# Patient Record
Sex: MALE | Race: WHITE | NOT HISPANIC OR LATINO
[De-identification: names, ages, dates, MRNs, and addresses within clinical notes are randomized per-mention and may not be internally consistent; named-entity substitution may affect disease eponyms.]

---

## 2021-01-17 ENCOUNTER — TRANSCRIPTION ENCOUNTER (OUTPATIENT)
Age: 31
End: 2021-01-17

## 2021-04-13 ENCOUNTER — TRANSCRIPTION ENCOUNTER (OUTPATIENT)
Age: 31
End: 2021-04-13

## 2021-11-08 ENCOUNTER — TRANSCRIPTION ENCOUNTER (OUTPATIENT)
Age: 31
End: 2021-11-08

## 2022-10-14 ENCOUNTER — NON-APPOINTMENT (OUTPATIENT)
Age: 32
End: 2022-10-14

## 2022-10-14 ENCOUNTER — APPOINTMENT (OUTPATIENT)
Dept: ORTHOPEDIC SURGERY | Facility: CLINIC | Age: 32
End: 2022-10-14

## 2022-10-14 VITALS
SYSTOLIC BLOOD PRESSURE: 115 MMHG | HEART RATE: 54 BPM | HEIGHT: 69 IN | BODY MASS INDEX: 25.18 KG/M2 | WEIGHT: 170 LBS | DIASTOLIC BLOOD PRESSURE: 71 MMHG

## 2022-10-14 DIAGNOSIS — M54.12 RADICULOPATHY, CERVICAL REGION: ICD-10-CM

## 2022-10-14 PROCEDURE — 72050 X-RAY EXAM NECK SPINE 4/5VWS: CPT

## 2022-10-14 PROCEDURE — 99204 OFFICE O/P NEW MOD 45 MIN: CPT

## 2022-10-14 NOTE — DISCUSSION/SUMMARY
[de-identified] : 32-year-old male with left-sided cervical radiculopathy for couple months\par \par Extensive discussion held with the patient about the natural history of this issue.  We discussed a trial of conservative treatment involving stretches and exercise and anti-inflammatories as needed.  Due to the neurologic symptoms been going on for 2 months I ordered an MRI of his cervical spine.  We discussed that most of these issues resolve with conservative treatment.  Then possibly steroid injections or ultimately spine surgery.  Encouraged the patient to remain active but avoid high impact activities or anything that will cause his head to get hit.  Patient was given handouts for home exercise and stretches.  A prescription for Medrol Dosepak was given to try to relieve the inflammation.  We will call the patient with the results of his MRI.

## 2022-10-14 NOTE — HISTORY OF PRESENT ILLNESS
[de-identified] : 32-year-old male presents with left-sided neck pain that radiates down his arm.  States he does have numbness and tingling worst in the first 3 fingers.  States this began a couple months ago he tried an inversion table which seemed to help but then it came back.  Patient is active in martial arts and wrestling believes wrestling they have initially started this.  He then got his martial arts a couple times and has had SnapBack which made the situation worse.  He feels his left arm is very weak compared to the right.  Denies issues with fine motor tasks.

## 2022-10-14 NOTE — PHYSICAL EXAM
[de-identified] : General Appearance: normal without acute distress\par Mental: Alert and oriented x 3\par Psych/affect: appropriate, cooperative\par \par Cervical spine\par Palpation: Mildly tender to palpation over left paraspinal muscles\par Motor: 5/5 C5-T1\par Sensory: 2/2 C5-T1\par Metzger: Negative [de-identified] : 4 views of the cervical spine taken today in office and reviewed–no abnormalities or instability appreciated

## 2022-11-04 ENCOUNTER — APPOINTMENT (OUTPATIENT)
Dept: MRI IMAGING | Facility: CLINIC | Age: 32
End: 2022-11-04

## 2023-01-06 ENCOUNTER — NON-APPOINTMENT (OUTPATIENT)
Age: 33
End: 2023-01-06

## 2023-04-17 ENCOUNTER — APPOINTMENT (OUTPATIENT)
Dept: ORTHOPEDIC SURGERY | Facility: CLINIC | Age: 33
End: 2023-04-17
Payer: COMMERCIAL

## 2023-04-17 ENCOUNTER — NON-APPOINTMENT (OUTPATIENT)
Age: 33
End: 2023-04-17

## 2023-04-17 PROCEDURE — 99213 OFFICE O/P EST LOW 20 MIN: CPT

## 2023-04-17 NOTE — DISCUSSION/SUMMARY
[de-identified] : Discussed findings of today's exam and possible causes of patient's pain.  Educated patient on their most probable diagnosis of acute right ankle pain after jumping exercise with sudden onset of right Achilles pain, clinical concern for at least high-grade tear if not complete rupture at the mid substance of the Achilles tendon.  Reviewed possible courses of treatment, and we collaboratively decided best course of treatment at this time will include advanced imaging with MRI to evaluate for extent of Achilles tendon tear/rupture.  Patient will be immobilized in a cam walker boot with an appropriate heel lift, he should be using this for ambulatory assistive device.  Patient will follow up when MRI results are available.  This appears to be an acute complicated injury of uncertain prognosis.  Patient is advised that I am a nonsurgical physician, we can review his MRI results over the phone as soon as they are available as if he does have a tendon rupture I would help facilitate consultation with one of my foot and ankle orthopedic associates.  Patient appreciates and agrees with current plan.\par \par This note was generated using dragon medical dictation software.  A reasonable effort has been made for proofreading its contents, but typos may still remain.  If there are any questions or points of clarification needed please notify my office.

## 2023-04-17 NOTE — PHYSICAL EXAM
[de-identified] : Constitutional: Well-nourished, well-developed, No acute distress\par Respiratory:  Good respiratory effort, no SOB\par Lymphatic: No regional lymphadenopathy, no lymphedema\par Psychiatric: Pleasant and normal affect, alert and oriented x3\par Musculoskeletal: normal except where as noted in regional exam\par \par Right ankle:\par APPEARANCE: + swelling and ecchymosis in the posterior ankle, no marked deformities or malalignment\par POSITIVE TENDERNESS: + With palpation of the posterior ankle overlying the Achilles tendon, palpable soft tissue defect within the Achilles tendon\par NONTENDER: medial malleolus, lateral malleolus, tibialis posterior tendon, ATFL, CFL, PTFL, anterior tibiofibular ligament (high ankle), sinus tarsus, deltoid ligaments, 5th metatarsal. \par RANGE OF MOTION: Limited dorsiflexion due to stiffness, swelling and pain. \par RESISTIVE TESTING: + Pain and weakness with resisted plantar flexion, mildly painful inversion/eversion/dorsiflexion \par SPECIAL TESTS: + Oro test \par

## 2023-04-17 NOTE — PROCEDURE
[de-identified] : Procedure:  Diagnostic Musculoskeletal Ultrasound of the right posterior ankle/Achilles tendon\par Indication: Clinical concern for Achilles tendon rupture\par \par Utlizing the [employer] II Edge portable ultrasound machine, and the Linear 6cm 13-6 MHz transducer, images of the involved area were obtained and saved.  Patient had linear homogenous signal within the distal Achilles tendon at its insertion on the calcaneus, however traveling proximally about 1-3 cm proximal to its insertion there was noted mixed heterogeneous signal, there was noted fluid due to the hypoechoic signal around the tendon, and there is a very mixed heterogeneous signal both longitudinally and transverse exam of the mid substance of the Achilles tendon concerning for at least partial high-grade tear if not full tendon rupture.\par \par \par Impression: Likely high-grade if not complete rupture of the mid substance of the Achilles tendon

## 2023-04-17 NOTE — HISTORY OF PRESENT ILLNESS
[de-identified] : Patient is here for right calf pain. Patient reports he on last Saturday around 1 week ago he jumped and felt that someone kicked on the back of the right ankle. Patient reports he has difficulty walking. He reports he has range of motion however dorsiflexion is painful. He has tried ice and elevation. \par \par The patient's past medical history, past surgical history, medications and allergies were reviewed by me today and documented accordingly. In addition, the patient's family and social history, which were noncontributory to this visit, were reviewed also. Intake form was reviewed. The patient has no family history of arthritis.

## 2023-04-18 ENCOUNTER — APPOINTMENT (OUTPATIENT)
Dept: ORTHOPEDIC SURGERY | Facility: CLINIC | Age: 33
End: 2023-04-18

## 2023-04-21 ENCOUNTER — RESULT REVIEW (OUTPATIENT)
Age: 33
End: 2023-04-21

## 2023-04-21 ENCOUNTER — OUTPATIENT (OUTPATIENT)
Dept: OUTPATIENT SERVICES | Facility: HOSPITAL | Age: 33
LOS: 1 days | End: 2023-04-21
Payer: COMMERCIAL

## 2023-04-21 DIAGNOSIS — Z00.00 ENCOUNTER FOR GENERAL ADULT MEDICAL EXAMINATION WITHOUT ABNORMAL FINDINGS: ICD-10-CM

## 2023-04-21 PROCEDURE — 73600 X-RAY EXAM OF ANKLE: CPT | Mod: 26,RT

## 2023-04-21 PROCEDURE — 73590 X-RAY EXAM OF LOWER LEG: CPT | Mod: 26,RT

## 2023-04-21 PROCEDURE — 73620 X-RAY EXAM OF FOOT: CPT | Mod: 26,RT

## 2023-04-25 ENCOUNTER — NON-APPOINTMENT (OUTPATIENT)
Age: 33
End: 2023-04-25

## 2023-04-25 ENCOUNTER — OUTPATIENT (OUTPATIENT)
Dept: OUTPATIENT SERVICES | Facility: HOSPITAL | Age: 33
LOS: 1 days | End: 2023-04-25

## 2023-04-25 ENCOUNTER — APPOINTMENT (OUTPATIENT)
Dept: MRI IMAGING | Facility: CLINIC | Age: 33
End: 2023-04-25
Payer: COMMERCIAL

## 2023-04-25 DIAGNOSIS — S86.019A STRAIN OF UNSPECIFIED ACHILLES TENDON, INITIAL ENCOUNTER: ICD-10-CM

## 2023-04-25 PROCEDURE — 73721 MRI JNT OF LWR EXTRE W/O DYE: CPT | Mod: 26,RT

## 2023-04-28 ENCOUNTER — APPOINTMENT (OUTPATIENT)
Dept: ORTHOPEDIC SURGERY | Facility: CLINIC | Age: 33
End: 2023-04-28
Payer: COMMERCIAL

## 2023-04-28 DIAGNOSIS — M79.661 PAIN IN RIGHT LOWER LEG: ICD-10-CM

## 2023-04-28 DIAGNOSIS — S86.019A STRAIN OF UNSPECIFIED ACHILLES TENDON, INITIAL ENCOUNTER: ICD-10-CM

## 2023-04-28 PROCEDURE — 99214 OFFICE O/P EST MOD 30 MIN: CPT

## 2023-04-28 NOTE — PHYSICAL EXAM
[de-identified] : Right ankle Physical Examination:\par \par General: Alert and oriented x3.  In no acute distress.  Pleasant in nature with a normal affect.  No apparent respiratory distress. \par Erythema, Warmth, Rubor: Negative\par Swelling: Positive\par \par ROM:\par Limited due to the Achilles tendon rupture and pain.\par \par Tenderness to Palpation: \par 1. Lateral Malleolus: Negative\par 2. Medial Malleolus: Negative\par 3. Proximal Fibular Pain: Negative\par 4. Heel Pain: Negative\par 5. Cuboid: Negative\par 6. Navicular: Negative\par 7. Tibiotalar Joint: Negative\par 8. Subtalar Joint: Negative\par 9. Posterior Recess: Negative\par \par Tendon Pain:\par 1. Achilles: Positive with a positive Oro test.\par 2. Peroneals: Negative\par 3. Posterior Tibialis: Negative\par 4. Tibialis Anterior: Negative\par \par Ligament Pain:\par 1. ATFL: Negative\par 2. CFL: Negative \par 3. PTFL: Negative\par 4. Deltoid Ligaments: Negative\par 5. Lis Franc Ligament: Negative\par \par Stability: \par 1. Anterior Drawer: Negative\par 2. Posterior Drawer: Negative\par \par Strength: 5/5 TA/GS/EHL\par \par Pulses: 2+ DP/PT Pulses\par \par Neuro: Intact motor and sensory\par \par Additional Test:\par 1. Calcaneal Squeeze Test: Negative\par 2. Syndesmosis Squeeze Test: Negative [de-identified] : MR Ankle No Cont, Right             Final\par \par No Documents Attached\par \par \par \par \par   EXAM: [id number] - MR ANKLE RT  - ORDERED BY: GEORGE STEVENSON\par \par \par PROCEDURE DATE:  04/25/2023\par \par \par \par INTERPRETATION:  EXAMINATION: MRI of the right ankle without contrast\par \par CLINICAL INFORMATION: Right ankle pain\par \par TECHNIQUE: Multiplanar, multisequential MR imaging was performed.\par \par FINDINGS: There is high-grade tearing of the proximal substance of the Achilles tendon at the level of its myotendinous junction with a thickened, wavy, and lax appearance of the tendon at this level level and marked adjacent myofascial edema and diffuse subcutaneous soft tissue edema. There is no well-defined/fluid-filled tear gap.\par \par There is no fracture or osteonecrosis. There are no joint effusions. Articular cartilage at the imaged joints is preserved. There are no subchondral abnormalities. The medial and lateral ankle ligaments are intact.\par \par The other tendons at the ankle are intact. There is no muscular atrophy. The plantar fascia is intact.\par \par IMPRESSION: High-grade tearing of the proximal substance of the Achilles tendon at the level of its myotendinous junction\par \par --- End of Report ---\par \par \par \par \par \par \par KODY MOULTON MD; Attending Radiologist\par This document has been electronically signed. Apr 25 2023  3:09PM\par \par  \par \par  Ordered by: RODO STEVENSON       Collected/Examined: 25Apr2023 03:01PM       \par Verified by: RODO STEVENSON 25Apr2023 03:21PM       \par  Result Communication: No patient communication needed at this time;\par Stage: Final       \par  Performed at: [facility]       Resulted: 25Apr2023 03:01PM       Last Updated: 25Apr2023 03:21PM       Accession: [id number]       \par \par \par \par \par EXAM: [id number] - XR FOOT 2 VIEWS RT - ORDERED BY: OZZY GATES\par \par EXAM: [id number] - XR ANKLE 2 VIEWS RT - ORDERED BY: OZZY GATES\par \par EXAM: [id number] - XR TIB FIB AP LAT 2 VIEWS RT - ORDERED BY: OZZY GATES\par \par \par PROCEDURE DATE: 04/21/2023\par \par \par \par INTERPRETATION: CLINICAL INDICATION: Calf, foot and ankle pain\par \par EXAM: AP and lateral views of the right tibia/fibula AP, lateral and oblique views of the right ankle and AP, lateral and oblique views of the right foot\par \par COMPARISON: None\par \par \par IMPRESSION:\par No acute fracture dislocation. Joint spaces are maintained. Os peroneum is present. Benign-appearing lucency is present within the medial talar head.\par \par --- End of Report ---\par \par \par \par \par \par \par JOSÉ CABALLERO MD; Attending Radiologist\par This document has been electronically signed. Apr 21 2023 1:58PM\par \par

## 2023-04-28 NOTE — ADDENDUM
[FreeTextEntry1] : I, GISELA COTA, acted solely as a scribe for Dr. Marlo Sahu on this date 04/28/2023  .\par  \par All medical record entries made by the Scribe were at my, Dr. Marlo Sahu, direction and personally dictated by me on 04/28/2023 . I have reviewed the chart and agree that the record accurately reflects my personal performance of the history, physical exam, assessment and plan. I have also personally directed, reviewed, and agreed with the chart.

## 2023-04-28 NOTE — HISTORY OF PRESENT ILLNESS
[FreeTextEntry1] : JO MARIANO is a 32 year old male who presents for initial evaluation of his right Achilles tendon.  The patient ruptured his Achilles tendon on 4/8/23. He was evaluated by Dr. Garza who ordered an MRI of the right Achilles. He states that he currently has no pain but has swelling to the lower leg around his ankle. He reports that he has been compliant with wearing a CAM boot and has been bearing weight on the right ankle. He presents today for review of his MRI results today in office. He presents today wearing slippers and is ambulating without assistance. He is currently taking no pain medication. No other complaints at this time.

## 2023-04-30 ENCOUNTER — APPOINTMENT (OUTPATIENT)
Dept: ULTRASOUND IMAGING | Facility: CLINIC | Age: 33
End: 2023-04-30
Payer: COMMERCIAL

## 2023-04-30 ENCOUNTER — EMERGENCY (EMERGENCY)
Facility: HOSPITAL | Age: 33
LOS: 0 days | Discharge: HOME | End: 2023-04-30
Attending: STUDENT IN AN ORGANIZED HEALTH CARE EDUCATION/TRAINING PROGRAM
Payer: COMMERCIAL

## 2023-04-30 ENCOUNTER — OUTPATIENT (OUTPATIENT)
Dept: OUTPATIENT SERVICES | Facility: HOSPITAL | Age: 33
LOS: 1 days | End: 2023-04-30
Payer: COMMERCIAL

## 2023-04-30 VITALS — HEIGHT: 70 IN | WEIGHT: 179.9 LBS

## 2023-04-30 VITALS
DIASTOLIC BLOOD PRESSURE: 80 MMHG | HEART RATE: 69 BPM | RESPIRATION RATE: 16 BRPM | TEMPERATURE: 98 F | SYSTOLIC BLOOD PRESSURE: 115 MMHG | OXYGEN SATURATION: 96 %

## 2023-04-30 DIAGNOSIS — I82.401 ACUTE EMBOLISM AND THROMBOSIS OF UNSPECIFIED DEEP VEINS OF RIGHT LOWER EXTREMITY: ICD-10-CM

## 2023-04-30 DIAGNOSIS — Z79.01 LONG TERM (CURRENT) USE OF ANTICOAGULANTS: ICD-10-CM

## 2023-04-30 DIAGNOSIS — M79.661 PAIN IN RIGHT LOWER LEG: ICD-10-CM

## 2023-04-30 LAB
ALBUMIN SERPL ELPH-MCNC: 3.9 G/DL — SIGNIFICANT CHANGE UP (ref 3.3–5)
ALP SERPL-CCNC: 90 U/L — SIGNIFICANT CHANGE UP (ref 40–120)
ALT FLD-CCNC: 30 U/L — SIGNIFICANT CHANGE UP (ref 12–78)
ANION GAP SERPL CALC-SCNC: 3 MMOL/L — LOW (ref 5–17)
APTT BLD: 31.9 SEC — SIGNIFICANT CHANGE UP (ref 27.5–35.5)
AST SERPL-CCNC: 30 U/L — SIGNIFICANT CHANGE UP (ref 15–37)
BASOPHILS # BLD AUTO: 0.07 K/UL — SIGNIFICANT CHANGE UP (ref 0–0.2)
BASOPHILS NFR BLD AUTO: 0.9 % — SIGNIFICANT CHANGE UP (ref 0–2)
BILIRUB SERPL-MCNC: 0.3 MG/DL — SIGNIFICANT CHANGE UP (ref 0.2–1.2)
BUN SERPL-MCNC: 26 MG/DL — HIGH (ref 7–23)
CALCIUM SERPL-MCNC: 9.1 MG/DL — SIGNIFICANT CHANGE UP (ref 8.5–10.1)
CHLORIDE SERPL-SCNC: 107 MMOL/L — SIGNIFICANT CHANGE UP (ref 96–108)
CO2 SERPL-SCNC: 26 MMOL/L — SIGNIFICANT CHANGE UP (ref 22–31)
CREAT SERPL-MCNC: 1.02 MG/DL — SIGNIFICANT CHANGE UP (ref 0.5–1.3)
EGFR: 101 ML/MIN/1.73M2 — SIGNIFICANT CHANGE UP
EOSINOPHIL # BLD AUTO: 0.19 K/UL — SIGNIFICANT CHANGE UP (ref 0–0.5)
EOSINOPHIL NFR BLD AUTO: 2.5 % — SIGNIFICANT CHANGE UP (ref 0–6)
GLUCOSE SERPL-MCNC: 98 MG/DL — SIGNIFICANT CHANGE UP (ref 70–99)
HCT VFR BLD CALC: 40.4 % — SIGNIFICANT CHANGE UP (ref 39–50)
HGB BLD-MCNC: 13.6 G/DL — SIGNIFICANT CHANGE UP (ref 13–17)
IMM GRANULOCYTES NFR BLD AUTO: 0.4 % — SIGNIFICANT CHANGE UP (ref 0–0.9)
INR BLD: 1.2 RATIO — HIGH (ref 0.88–1.16)
LYMPHOCYTES # BLD AUTO: 2.14 K/UL — SIGNIFICANT CHANGE UP (ref 1–3.3)
LYMPHOCYTES # BLD AUTO: 27.6 % — SIGNIFICANT CHANGE UP (ref 13–44)
MCHC RBC-ENTMCNC: 29.1 PG — SIGNIFICANT CHANGE UP (ref 27–34)
MCHC RBC-ENTMCNC: 33.7 GM/DL — SIGNIFICANT CHANGE UP (ref 32–36)
MCV RBC AUTO: 86.5 FL — SIGNIFICANT CHANGE UP (ref 80–100)
MONOCYTES # BLD AUTO: 0.44 K/UL — SIGNIFICANT CHANGE UP (ref 0–0.9)
MONOCYTES NFR BLD AUTO: 5.7 % — SIGNIFICANT CHANGE UP (ref 2–14)
NEUTROPHILS # BLD AUTO: 4.88 K/UL — SIGNIFICANT CHANGE UP (ref 1.8–7.4)
NEUTROPHILS NFR BLD AUTO: 62.9 % — SIGNIFICANT CHANGE UP (ref 43–77)
PLATELET # BLD AUTO: 351 K/UL — SIGNIFICANT CHANGE UP (ref 150–400)
POTASSIUM SERPL-MCNC: 3.9 MMOL/L — SIGNIFICANT CHANGE UP (ref 3.5–5.3)
POTASSIUM SERPL-SCNC: 3.9 MMOL/L — SIGNIFICANT CHANGE UP (ref 3.5–5.3)
PROT SERPL-MCNC: 7.6 GM/DL — SIGNIFICANT CHANGE UP (ref 6–8.3)
PROTHROM AB SERPL-ACNC: 13.9 SEC — HIGH (ref 10.5–13.4)
RBC # BLD: 4.67 M/UL — SIGNIFICANT CHANGE UP (ref 4.2–5.8)
RBC # FLD: 11.6 % — SIGNIFICANT CHANGE UP (ref 10.3–14.5)
SODIUM SERPL-SCNC: 136 MMOL/L — SIGNIFICANT CHANGE UP (ref 135–145)
WBC # BLD: 7.75 K/UL — SIGNIFICANT CHANGE UP (ref 3.8–10.5)
WBC # FLD AUTO: 7.75 K/UL — SIGNIFICANT CHANGE UP (ref 3.8–10.5)

## 2023-04-30 PROCEDURE — 80053 COMPREHEN METABOLIC PANEL: CPT

## 2023-04-30 PROCEDURE — 93971 EXTREMITY STUDY: CPT | Mod: 26,RT

## 2023-04-30 PROCEDURE — 36415 COLL VENOUS BLD VENIPUNCTURE: CPT

## 2023-04-30 PROCEDURE — 85025 COMPLETE CBC W/AUTO DIFF WBC: CPT

## 2023-04-30 PROCEDURE — 85730 THROMBOPLASTIN TIME PARTIAL: CPT

## 2023-04-30 PROCEDURE — 99285 EMERGENCY DEPT VISIT HI MDM: CPT

## 2023-04-30 PROCEDURE — 93971 EXTREMITY STUDY: CPT

## 2023-04-30 PROCEDURE — 85610 PROTHROMBIN TIME: CPT

## 2023-04-30 PROCEDURE — 99284 EMERGENCY DEPT VISIT MOD MDM: CPT

## 2023-04-30 RX ORDER — APIXABAN 2.5 MG/1
10 TABLET, FILM COATED ORAL ONCE
Refills: 0 | Status: COMPLETED | OUTPATIENT
Start: 2023-04-30 | End: 2023-04-30

## 2023-04-30 RX ORDER — APIXABAN 2.5 MG/1
1 TABLET, FILM COATED ORAL
Qty: 42 | Refills: 0
Start: 2023-04-30 | End: 2023-05-20

## 2023-04-30 RX ADMIN — APIXABAN 10 MILLIGRAM(S): 2.5 TABLET, FILM COATED ORAL at 18:42

## 2023-04-30 NOTE — ED STATDOCS - OBJECTIVE STATEMENT
Pt is a 33 y/o M w/ no PMHx who recently had a right achilles tendon tear and was planned for surgery on Tuesday 5/2. pt was sent in because he had an ultrasound for the right lower extremity and found to have occlusive thrombus in the posterior tibial, peroneal and soleal veins of the calf and was sent in for evaluation and management. Pt denies new LE edema, recent surgeries, h/o VTE, long distance travel, and denies tobacco use.

## 2023-04-30 NOTE — ED ADULT NURSE NOTE - OBJECTIVE STATEMENT
pt presents to ED with complaints of blood clot found on out pt US. pt sent in as he was supposed to have right achilles surgery on 5/2

## 2023-04-30 NOTE — ED STATDOCS - PHYSICAL EXAMINATION
PHYSICAL EXAM:  GENERAL: in NAD, Sitting comfortable in bed, in no respiratory distress  HEAD: Atraumatic, no handy's sign, no periorbital ecchymosis   EYES: PERRL, EOMs intact b/l w/out deficits  ENMT: Moist membranes, no anterior/posterior, or supraclavicular LAD  CHEST/LUNG: CTAB no wheezes/rhonchi/rales  HEART: RRR no murmur/gallops/rubs  ABDOMEN: +BS, soft, NT, ND  EXTREMITIES: No LE edema, +2 radial pulses b/l, RLE with +2 ankle and foot edema  NERVOUS SYSTEM:  A&Ox4, No motor deficits or sensory deficits to b/l UEs  Heme/LYMPH: No ecchymosis or bruising or LAD  SKIN:  No new rashes or DTIs

## 2023-04-30 NOTE — ED STATDOCS - NS ED ROS FT
Constitutional: Denies fevers & chills  HEENT: Denies Rhinorrhea & sore throat  Cardiac: Denies Chest pain & new LE edema  Pulmonary: Denies Shortness of breath & Cough  Abdomen: Denies Abdominal pain, N/V, blood in stools, diarrhea   : Denies dysuria & hematuria.  Skin: Denies Rash or itching  Neuro: Denies new visual changes, confusion, headaches, and one sided paralysis  Psych: Denies SI & HI & Depression

## 2023-04-30 NOTE — ED STATDOCS - NSFOLLOWUPINSTRUCTIONS_ED_ALL_ED_FT
You were seen at [facility] for a lower extremity DVT.     You had normal kidney function and were given Eliquis for anticoagulation.     You are to continue to follow-up with your orthopedic surgeon as instructed and he will call you to coordinate your surgery.     You are to take Eliquis 10mg twice a day for 7 days and then 5mg twice a day after this for at least 3-6 months.     You are to follow-up with your primary care doctor as soon as possible and a hematologist (referral numbers given below) as soon as possible for further management of your anti-coagulation.     You should return to the hospital if you begin to have any bright red blood in your stools, black tarry stools, nausea with vomiting of blood or black coffee ground like material, if your have any head trauma, or for any other concerns.    Cherry Sheehan MD  Phone: [phone]  Address: [facility] [place] [address]

## 2023-04-30 NOTE — ED STATDOCS - PROGRESS NOTE DETAILS
Spoke to ortho and Attending  Will discharge home on Eliquis and will f/u orthopaedics for plan for surgery in the near future. Will also give referral for management of anticoagulation to Heme and also instructions to see his PMD.   -MARY ELLEN Cruz-MS, MD  Internal/Emergency/Critical Medicine

## 2023-04-30 NOTE — ED STATDOCS - PATIENT PORTAL LINK FT
You can access the FollowMyHealth Patient Portal offered by [facility] by registering at the following website: [email or web address]. By joining [employer]’s FollowMyHealth portal, you will also be able to view your health information using other applications (apps) compatible with our system.

## 2023-04-30 NOTE — ED STATDOCS - CLINICAL SUMMARY MEDICAL DECISION MAKING FREE TEXT BOX
pt is a 38 y/o Male with recent right achilles tear pending surgery now with provoked DVT without symptoms of PE at this time.   Will get CBC, CMP, Coags  Will discuss with Ortho about AC mgt as pt ok to be d/yola home on PO DOAC at this time with follow-up  Given this is a provoked DVT with definitive cause pt needs 3-6 months of AC  Will likely discharge home

## 2023-04-30 NOTE — CONSULT NOTE ADULT - SUBJECTIVE AND OBJECTIVE BOX
32y Male was sent in by Dr. Sahu for LE doppler positive for RLE DVT. Patient states he tore his Achilles tendon 3 weeks ago while exercising. Denies HS, LOC, or other injuries. He was able to ambulate with a limp afterwards and saw Dr. Garza 2 weeks after the injury who referred him to Dr. Sahu. He had an appointment with Dr. Sahu on 4/28 and an MRI and Dopplers were obtained. The MRI showed a high grade tear at the myotendinous junction and surgery was scheduled for Tuesday, 5/2. Patient reports taking ASA 325mg daily as prescribed by Dr. Sahu. Denies numbness/tingling in the affected extremity. Denies CP, SOB, palpitations.    PAST MEDICAL & SURGICAL HISTORY:  No pertinent past medical history      No significant past surgical history        Home Medications:    Allergies    No Known Allergies    Intolerances                        Vital Signs Last 24 Hrs  T(C): 36.9 (30 Apr 2023 16:50), Max: 36.9 (30 Apr 2023 16:50)  T(F): 98.5 (30 Apr 2023 16:50), Max: 98.5 (30 Apr 2023 16:50)  HR: 69 (30 Apr 2023 16:50) (69 - 69)  BP: 115/80 (30 Apr 2023 16:50) (115/80 - 115/80)  BP(mean): --  RR: 16 (30 Apr 2023 16:50) (16 - 16)  SpO2: 96% (30 Apr 2023 16:50) (96% - 96%)    Parameters below as of 30 Apr 2023 16:50  Patient On (Oxygen Delivery Method): room air        PHYSICAL EXAM  General: NAD, Awake and Alert  RLE  skin intact  no TTP along Achilles tendon  negative Oro test  TA/GSC/EHL/FHL intact  calves NTTP  compartments soft and compressible  DP/PT palpable        Assessment/Plan:  32y Male with R Achilles tear complicated by RLE DVT    -DVT/PE workup and ppx/treatment per ED  -no ortho contraindication to beginning DVT treatment  -WBAT RLE in [facility] boot  -ortho stable for DC  -FU with Dr. Sahu in office this week, call office for appointment  -discussed with Dr. Sahu who agrees with plan

## 2023-04-30 NOTE — CONSULT NOTE ADULT - CONSULT REASON
RLE DVT on ipsilateral leg with Achilles tear RLE DVT on ipsilateral leg with Achilles tear  Consult initially seen as patient checking in to ED , ~1623, notified prior to patient's arrival ~1513

## 2023-05-01 ENCOUNTER — APPOINTMENT (OUTPATIENT)
Dept: ORTHOPEDIC SURGERY | Facility: CLINIC | Age: 33
End: 2023-05-01
Payer: COMMERCIAL

## 2023-05-01 ENCOUNTER — OUTPATIENT (OUTPATIENT)
Dept: OUTPATIENT SERVICES | Facility: HOSPITAL | Age: 33
LOS: 1 days | Discharge: HOME | End: 2023-05-01

## 2023-05-01 DIAGNOSIS — I82.409 ACUTE EMBOLISM AND THROMBOSIS OF UNSPECIFIED DEEP VEINS OF UNSPECIFIED LOWER EXTREMITY: ICD-10-CM

## 2023-05-01 PROCEDURE — 99442: CPT

## 2023-05-02 ENCOUNTER — APPOINTMENT (OUTPATIENT)
Dept: ORTHOPEDIC SURGERY | Facility: HOSPITAL | Age: 33
End: 2023-05-02

## 2023-05-03 ENCOUNTER — NON-APPOINTMENT (OUTPATIENT)
Age: 33
End: 2023-05-03

## 2023-05-03 ENCOUNTER — APPOINTMENT (OUTPATIENT)
Dept: HEMATOLOGY ONCOLOGY | Facility: CLINIC | Age: 33
End: 2023-05-03
Payer: COMMERCIAL

## 2023-05-03 VITALS
BODY MASS INDEX: 26.66 KG/M2 | OXYGEN SATURATION: 97 % | HEIGHT: 69 IN | HEART RATE: 59 BPM | TEMPERATURE: 97.3 F | SYSTOLIC BLOOD PRESSURE: 132 MMHG | WEIGHT: 180 LBS | DIASTOLIC BLOOD PRESSURE: 83 MMHG

## 2023-05-03 DIAGNOSIS — Z78.9 OTHER SPECIFIED HEALTH STATUS: ICD-10-CM

## 2023-05-03 PROBLEM — S86.019A STRAIN OF UNSPECIFIED ACHILLES TENDON, INITIAL ENCOUNTER: Chronic | Status: ACTIVE | Noted: 2023-05-01

## 2023-05-03 PROBLEM — M54.12 RADICULOPATHY, CERVICAL REGION: Chronic | Status: ACTIVE | Noted: 2023-05-01

## 2023-05-03 PROCEDURE — 99204 OFFICE O/P NEW MOD 45 MIN: CPT

## 2023-05-03 RX ORDER — METHYLPREDNISOLONE 4 MG/1
4 TABLET ORAL
Qty: 1 | Refills: 0 | Status: DISCONTINUED | COMMUNITY
Start: 2022-10-14 | End: 2023-05-03

## 2023-05-03 RX ORDER — ASPIRIN 325 MG/1
325 TABLET ORAL DAILY
Qty: 30 | Refills: 2 | Status: DISCONTINUED | COMMUNITY
Start: 2023-04-28 | End: 2023-05-03

## 2023-05-04 NOTE — PHYSICAL EXAM
[Normal] : affect appropriate [de-identified] : Ambulates with boot, heel lifts, and crutches  [de-identified] : no edema [de-identified] : CAM boot right leg

## 2023-05-04 NOTE — CONSULT LETTER
[Dear  ___] : Dear  [unfilled], [Consult Letter:] : I had the pleasure of evaluating your patient, [unfilled]. [Please see my note below.] : Please see my note below. [Consult Closing:] : Thank you very much for allowing me to participate in the care of this patient.  If you have any questions, please do not hesitate to contact me. [Sincerely,] : Sincerely, [FreeTextEntry3] : Milka Rojas MD\par Medical Oncology/Hematology\par [facility] [place], [facility]\par \par \par [facility]\par

## 2023-05-04 NOTE — ASSESSMENT
[FreeTextEntry1] : 32 year old male with high grade tear of R achilles tendon, sustained injury on 4/8/23.\par US Duplex Venous LE on 4/30/23 - showed DVT involving the posterior tibial, peroneal and soleal veins of the right calf. He is on eliquis.\par \par He has no prior personal or family history of VTE.\par \par This is a provoked DVT in setting of trauma/limited mobility.  Little utility for thrombophilia testing in this setting. We discussed minimum 3 months of AC.\par Discussed with patient to follow up with Dr. Sahu, re:surgery\par \par

## 2023-05-04 NOTE — ADDENDUM
[FreeTextEntry1] : Documented by Callie Nieves acting as scribe for Dr. Rojas on 05/03/2023.\par \par All Medical record entries made by the Scribe were at my, Dr. Rojas, direction and personally dictated by me on 05/03/2023. I have reviewed the chart and agree that the record accurately reflects my personal performance of the history, physical exam, assessment and plan. I have also personally directed, reviewed, and agreed with the discharge instructions.

## 2023-05-04 NOTE — HISTORY OF PRESENT ILLNESS
[de-identified] : OJ MARIANO is a 32 year old M with no personal or family h/o blood clots or bleeding disorder who was diagnosed with Right leg below the knee DVT at age 32 in 4/2023. Patient was referred by ortho, Dr. Sahu. \par \par On 4/8/23, pt was exercising in martial arts class, jumped and suffered a high-grade tearing of the proximal substance of the Achilles tendon at the level of its myotendinous junction of his right ankle. He's currently in a CAM boot. \par \par Patient obtained US Duplex Venous LE on 4/30/23 demonstrating Acute below the knee DVT involving the posterior tibial, peroneal and soleal veins of the right calf.\par \par Patient presents for initial visit. Ambulates with CAM boot, heel lifts and crutches. \par Reports RLE swelling, worse with boot\par Denies R leg pain \par Denies testosterone or anabolic steroid use. \par On Eliquis 10 mg BID started on 4/30/23\par \par \par FMH: Denies family h/o blood clots \par Sx: hand sx, tonsillitis, cyst in throat removed, shoulder sx \par Socx: Never smoker, social EtOH use. 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 Sahu\par Vascular surgeon Dr. Grimes\par \par

## 2023-05-08 ENCOUNTER — APPOINTMENT (OUTPATIENT)
Dept: ORTHOPEDIC SURGERY | Facility: CLINIC | Age: 33
End: 2023-05-08
Payer: COMMERCIAL

## 2023-05-08 DIAGNOSIS — S86.011A STRAIN OF RIGHT ACHILLES TENDON, INITIAL ENCOUNTER: ICD-10-CM

## 2023-05-08 PROCEDURE — 99442: CPT

## 2023-05-10 ENCOUNTER — APPOINTMENT (OUTPATIENT)
Dept: ORTHOPEDIC SURGERY | Facility: CLINIC | Age: 33
End: 2023-05-10

## 2023-05-17 ENCOUNTER — APPOINTMENT (OUTPATIENT)
Dept: ORTHOPEDIC SURGERY | Facility: CLINIC | Age: 33
End: 2023-05-17

## 2023-05-17 ENCOUNTER — NON-APPOINTMENT (OUTPATIENT)
Age: 33
End: 2023-05-17

## 2023-05-19 ENCOUNTER — APPOINTMENT (OUTPATIENT)
Dept: ULTRASOUND IMAGING | Facility: CLINIC | Age: 33
End: 2023-05-19

## 2023-05-22 ENCOUNTER — APPOINTMENT (OUTPATIENT)
Dept: ULTRASOUND IMAGING | Facility: CLINIC | Age: 33
End: 2023-05-22
Payer: COMMERCIAL

## 2023-05-22 PROCEDURE — 93971 EXTREMITY STUDY: CPT | Mod: RT

## 2023-06-02 ENCOUNTER — TRANSCRIPTION ENCOUNTER (OUTPATIENT)
Age: 33
End: 2023-06-02

## 2023-07-19 RX ORDER — APIXABAN 5 MG/1
5 TABLET, FILM COATED ORAL
Qty: 180 | Refills: 2 | Status: ACTIVE | COMMUNITY
Start: 2023-04-30 | End: 1900-01-01

## 2023-07-31 ENCOUNTER — OUTPATIENT (OUTPATIENT)
Dept: OUTPATIENT SERVICES | Facility: HOSPITAL | Age: 33
LOS: 1 days | Discharge: HOME | End: 2023-07-31

## 2023-07-31 DIAGNOSIS — I82.409 ACUTE EMBOLISM AND THROMBOSIS OF UNSPECIFIED DEEP VEINS OF UNSPECIFIED LOWER EXTREMITY: ICD-10-CM

## 2023-08-01 ENCOUNTER — APPOINTMENT (OUTPATIENT)
Dept: HEMATOLOGY ONCOLOGY | Facility: CLINIC | Age: 33
End: 2023-08-01
Payer: COMMERCIAL

## 2023-08-01 VITALS
DIASTOLIC BLOOD PRESSURE: 70 MMHG | WEIGHT: 175.82 LBS | OXYGEN SATURATION: 97 % | HEIGHT: 69 IN | TEMPERATURE: 97.9 F | SYSTOLIC BLOOD PRESSURE: 110 MMHG | BODY MASS INDEX: 26.04 KG/M2 | HEART RATE: 69 BPM

## 2023-08-01 DIAGNOSIS — I82.431 ACUTE EMBOLISM AND THROMBOSIS OF RIGHT POPLITEAL VEIN: ICD-10-CM

## 2023-08-01 PROCEDURE — 99214 OFFICE O/P EST MOD 30 MIN: CPT

## 2023-08-01 NOTE — ASSESSMENT
[FreeTextEntry1] : 32 year old male with high grade tear of R achilles tendon, sustained injury on 4/8/23. Developed provoked DVT.  US Duplex Venous LE on 4/30/23 - showed DVT involving the posterior tibial, peroneal and soleal veins of the right calf. He is on eliquis.  He has no prior personal or family history of VTE.  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  S/p right achilles tendon repair by Dr. Luis F Mitchell on 5/23/23.  Plan: This is a provoked DVT in setting of trauma/limited mobility.   Continue eliquis for another 1 month until he is out of cast and fully able to bear weight on R leg.   Follow up PRN S/S of VTE reviewed.

## 2023-08-01 NOTE — HISTORY OF PRESENT ILLNESS
[de-identified] : OJ MARIANO is a 32 year old M with no personal or family h/o blood clots or bleeding disorder who was diagnosed with Right leg below the knee DVT at age 32 in 4/2023. Patient was referred by ortho, Dr. Sahu. \par  \par  On 4/8/23, pt was exercising in martial arts class, jumped and suffered a high-grade tearing of the proximal substance of the Achilles tendon at the level of its myotendinous junction of his right ankle. He's currently in a CAM boot. \par  \par  Patient obtained US Duplex Venous LE on 4/30/23 demonstrating Acute below the knee DVT involving the posterior tibial, peroneal and soleal veins of the right calf.\par  \par  Patient presents for initial visit. Ambulates with CAM boot, heel lifts and crutches. \par  Reports RLE swelling, worse with boot\par  Denies R leg pain \par  Denies testosterone or anabolic steroid use. \par  On Eliquis 10 mg BID started on 4/30/23\par  \par  \par  FMH: Denies family h/o blood clots \par  Sx: hand sx, tonsillitis, cyst in throat removed, shoulder sx \par  Socx: Never smoker, social EtOH use. 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 Sahu\par  Vascular surgeon Dr. Grimes\par  \par   [de-identified] : Returns for follow up. S/p right achilles tendon repair by Dr. Luis F Mitchell on 5/23/23. He is still in a cast but mobility has improved. Getting PT 1-2 times per week.  he continues on eliquis 5 mg BID.  No leg pain, swelling.

## 2023-08-01 NOTE — CONSULT LETTER
[Dear  ___] : Dear  [unfilled], [Consult Letter:] : I had the pleasure of evaluating your patient, [unfilled]. [Please see my note below.] : Please see my note below. [Consult Closing:] : Thank you very much for allowing me to participate in the care of this patient.  If you have any questions, please do not hesitate to contact me. [Sincerely,] : Sincerely, [FreeTextEntry3] : Milka Rojas MD\par  Medical Oncology/Hematology\par  [facility] [place], [facility]\par  \par  \par  [facility]\par

## 2023-08-01 NOTE — PHYSICAL EXAM
[Normal] : affect appropriate [de-identified] : Ambulates with boot, heel lifts, and crutches  [de-identified] : no edema [de-identified] : CAM boot right leg

## 2024-03-21 ENCOUNTER — APPOINTMENT (OUTPATIENT)
Dept: ULTRASOUND IMAGING | Facility: CLINIC | Age: 34
End: 2024-03-21
Payer: COMMERCIAL

## 2024-03-21 PROCEDURE — 93971 EXTREMITY STUDY: CPT | Mod: LT

## 2024-04-15 ENCOUNTER — OUTPATIENT (OUTPATIENT)
Dept: OUTPATIENT SERVICES | Facility: HOSPITAL | Age: 34
LOS: 1 days | Discharge: HOME | End: 2024-04-15

## 2024-04-15 DIAGNOSIS — I82.409 ACUTE EMBOLISM AND THROMBOSIS OF UNSPECIFIED DEEP VEINS OF UNSPECIFIED LOWER EXTREMITY: ICD-10-CM

## 2024-04-17 ENCOUNTER — APPOINTMENT (OUTPATIENT)
Dept: HEMATOLOGY ONCOLOGY | Facility: CLINIC | Age: 34
End: 2024-04-17
Payer: COMMERCIAL

## 2024-04-17 VITALS
DIASTOLIC BLOOD PRESSURE: 84 MMHG | OXYGEN SATURATION: 99 % | WEIGHT: 173 LBS | HEART RATE: 52 BPM | HEIGHT: 69 IN | BODY MASS INDEX: 25.62 KG/M2 | TEMPERATURE: 97.5 F | SYSTOLIC BLOOD PRESSURE: 121 MMHG

## 2024-04-17 DIAGNOSIS — I82.409 ACUTE EMBOLISM AND THROMBOSIS OF UNSPECIFIED DEEP VEINS OF UNSPECIFIED LOWER EXTREMITY: ICD-10-CM

## 2024-04-17 PROCEDURE — 99214 OFFICE O/P EST MOD 30 MIN: CPT

## 2024-04-17 NOTE — HISTORY OF PRESENT ILLNESS
[de-identified] : Patient is a 33 M with no personal or family h/o blood clots or bleeding disorder who was diagnosed with Right leg below the knee DVT at age 32 in 4/2023. Patient was referred by ortho, Dr. Sahu.  On 4/8/23, pt was exercising in martial arts class, jumped and suffered a high-grade tearing of the proximal substance of the Achilles tendon at the level of its myotendinous junction of his right ankle. He's currently in a CAM boot.  Patient obtained US Duplex Venous LE on 4/30/23 demonstrating Acute below the knee DVT involving the posterior tibial, peroneal and soleal veins of the right calf.  Patient presents for initial visit. Ambulates with CAM boot, heel lifts and crutches. Reports RLE swelling, worse with boot Denies R leg pain Denies testosterone or anabolic steroid use. On Eliquis 10 mg BID started on 4/30/23   FMH: Denies family h/o blood clots Sx: hand sx, tonsillitis, cyst in throat removed, shoulder sx Socx: Never smoker, social EtOH use. 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  Care Team: Orthopaedist, Dr. Sahu Vascular surgeon Dr. Griems.       Interval History: Returns for follow up. Patient had urgent ortho surgery on LLE following injury. Was discharged on prophylactic dose Xarelto for 2-3 weeks. After completing course, had increased pain and swelling. Had US showing below the knee acute DVT. Was prescribed Xarelto 30 mg daily for 1 month but was having difficulty getting prescription filled as well as getting a prescription for full 3 month course from Ortho specialist. Began taking left over Eliquis 5 mg BID he had at home.   Denies lower extremity pain/swelling, chest pain, SOB, palpitations.

## 2024-04-17 NOTE — ASSESSMENT
[FreeTextEntry1] : 33 year old male with previous provoked DVT following high grade tear of R achilles tendon s/p Eliquis returns for follow-up for another provoked DVT, know LLE, after another orthopedic injury and surgical repair.   He has no prior personal or family history of VTE.  Plan: This is a provoked DVT in setting of trauma/limited mobility/surgery Continue Eliquis 3 month course VA Duplex Bilateral. Previous DVT study shows residual thrombus on previous RLE DVT Will hold off on hypercoagulable work-up as both DVTs are provoked

## 2024-05-28 RX ORDER — APIXABAN 5 MG/1
5 TABLET, FILM COATED ORAL
Qty: 60 | Refills: 0 | Status: ACTIVE | COMMUNITY
Start: 2024-04-17 | End: 1900-01-01

## 2024-07-17 ENCOUNTER — OUTPATIENT (OUTPATIENT)
Dept: OUTPATIENT SERVICES | Facility: HOSPITAL | Age: 34
LOS: 1 days | End: 2024-07-17

## 2024-07-17 ENCOUNTER — APPOINTMENT (OUTPATIENT)
Dept: ULTRASOUND IMAGING | Facility: CLINIC | Age: 34
End: 2024-07-17
Payer: COMMERCIAL

## 2024-07-17 DIAGNOSIS — I82.409 ACUTE EMBOLISM AND THROMBOSIS OF UNSPECIFIED DEEP VEINS OF UNSPECIFIED LOWER EXTREMITY: ICD-10-CM

## 2024-07-17 PROCEDURE — 93970 EXTREMITY STUDY: CPT | Mod: 26

## 2024-08-14 DIAGNOSIS — M79.661 PAIN IN RIGHT LOWER LEG: ICD-10-CM

## 2024-08-14 DIAGNOSIS — I82.409 ACUTE EMBOLISM AND THROMBOSIS OF UNSPECIFIED DEEP VEINS OF UNSPECIFIED LOWER EXTREMITY: ICD-10-CM

## 2024-08-14 RX ORDER — APIXABAN 5 MG (74)
5 KIT ORAL
Qty: 1 | Refills: 0 | Status: ACTIVE | COMMUNITY
Start: 2024-08-14 | End: 1900-01-01

## 2024-08-18 ENCOUNTER — OUTPATIENT (OUTPATIENT)
Dept: OUTPATIENT SERVICES | Facility: HOSPITAL | Age: 34
LOS: 1 days | Discharge: HOME | End: 2024-08-18

## 2024-08-18 DIAGNOSIS — I82.409 ACUTE EMBOLISM AND THROMBOSIS OF UNSPECIFIED DEEP VEINS OF UNSPECIFIED LOWER EXTREMITY: ICD-10-CM

## 2024-08-21 ENCOUNTER — OUTPATIENT (OUTPATIENT)
Dept: OUTPATIENT SERVICES | Facility: HOSPITAL | Age: 34
LOS: 1 days | End: 2024-08-21
Payer: COMMERCIAL

## 2024-08-21 ENCOUNTER — APPOINTMENT (OUTPATIENT)
Dept: ULTRASOUND IMAGING | Facility: CLINIC | Age: 34
End: 2024-08-21
Payer: COMMERCIAL

## 2024-08-21 DIAGNOSIS — M79.661 PAIN IN RIGHT LOWER LEG: ICD-10-CM

## 2024-08-21 PROCEDURE — 93970 EXTREMITY STUDY: CPT | Mod: 26

## 2024-08-21 PROCEDURE — 93970 EXTREMITY STUDY: CPT

## 2024-08-27 ENCOUNTER — RESULT REVIEW (OUTPATIENT)
Age: 34
End: 2024-08-27

## 2024-08-27 ENCOUNTER — APPOINTMENT (OUTPATIENT)
Dept: HEMATOLOGY ONCOLOGY | Facility: CLINIC | Age: 34
End: 2024-08-27
Payer: COMMERCIAL

## 2024-08-27 VITALS
HEART RATE: 51 BPM | SYSTOLIC BLOOD PRESSURE: 126 MMHG | HEIGHT: 69 IN | BODY MASS INDEX: 25.92 KG/M2 | TEMPERATURE: 97.9 F | RESPIRATION RATE: 18 BRPM | OXYGEN SATURATION: 97 % | WEIGHT: 175 LBS | DIASTOLIC BLOOD PRESSURE: 77 MMHG

## 2024-08-27 DIAGNOSIS — I82.431 ACUTE EMBOLISM AND THROMBOSIS OF RIGHT POPLITEAL VEIN: ICD-10-CM

## 2024-08-27 LAB
BASOPHILS # BLD AUTO: 0.1 K/UL — SIGNIFICANT CHANGE UP (ref 0–0.2)
BASOPHILS NFR BLD AUTO: 1.6 % — SIGNIFICANT CHANGE UP (ref 0–2)
EOSINOPHIL # BLD AUTO: 0.2 K/UL — SIGNIFICANT CHANGE UP (ref 0–0.5)
EOSINOPHIL NFR BLD AUTO: 2.9 % — SIGNIFICANT CHANGE UP (ref 0–6)
HCT VFR BLD CALC: 44.5 % — SIGNIFICANT CHANGE UP (ref 39–50)
HGB BLD-MCNC: 14.6 G/DL — SIGNIFICANT CHANGE UP (ref 13–17)
LYMPHOCYTES # BLD AUTO: 1.8 K/UL — SIGNIFICANT CHANGE UP (ref 1–3.3)
LYMPHOCYTES # BLD AUTO: 32 % — SIGNIFICANT CHANGE UP (ref 13–44)
MCHC RBC-ENTMCNC: 29.6 PG — SIGNIFICANT CHANGE UP (ref 27–34)
MCHC RBC-ENTMCNC: 32.9 G/DL — SIGNIFICANT CHANGE UP (ref 32–36)
MCV RBC AUTO: 90 FL — SIGNIFICANT CHANGE UP (ref 80–100)
MONOCYTES # BLD AUTO: 0.4 K/UL — SIGNIFICANT CHANGE UP (ref 0–0.9)
MONOCYTES NFR BLD AUTO: 7.2 % — SIGNIFICANT CHANGE UP (ref 2–14)
NEUTROPHILS # BLD AUTO: 3.2 K/UL — SIGNIFICANT CHANGE UP (ref 1.8–7.4)
NEUTROPHILS NFR BLD AUTO: 56.2 % — SIGNIFICANT CHANGE UP (ref 43–77)
PLATELET # BLD AUTO: 318 K/UL — SIGNIFICANT CHANGE UP (ref 150–400)
RBC # BLD: 4.94 M/UL — SIGNIFICANT CHANGE UP (ref 4.2–5.8)
RBC # FLD: 11 % — SIGNIFICANT CHANGE UP (ref 10.3–14.5)
WBC # BLD: 5.6 K/UL — SIGNIFICANT CHANGE UP (ref 3.8–10.5)
WBC # FLD AUTO: 5.6 K/UL — SIGNIFICANT CHANGE UP (ref 3.8–10.5)

## 2024-08-27 PROCEDURE — 99214 OFFICE O/P EST MOD 30 MIN: CPT

## 2024-08-27 NOTE — ASSESSMENT
[FreeTextEntry1] : 33 year old male with previous provoked DVT following high grade tear of R achilles tendon s/p Omega returns for follow-up for another provoked DVT, know LLE, after another orthopedic injury and surgical repair.   He has no prior personal or family history of VTE.  Plan: - Previous DVTs were both provoked. - However, will send patient for hypercoagulable work-up given young age and recent DVT developing even after receiving prophylactic AC - Recent VA Duplex Bilateral negative for DVT - Given how symptomatic patient when he develops a DVT even with 1 month of prophylactic AC, may be best to be on a prolonged prophylactic course if needed again in the future

## 2024-08-27 NOTE — HISTORY OF PRESENT ILLNESS
[de-identified] : Patient is a 33 M with no personal or family h/o blood clots or bleeding disorder who was diagnosed with Right leg below the knee DVT at age 32 in 4/2023. Patient was referred by ortho, Dr. Sahu.  On 4/8/23, pt was exercising in martial arts class, jumped and suffered a high-grade tearing of the proximal substance of the Achilles tendon at the level of its myotendinous junction of his right ankle. He's currently in a CAM boot.  Patient obtained US Duplex Venous LE on 4/30/23 demonstrating Acute below the knee DVT involving the posterior tibial, peroneal and soleal veins of the right calf.  Patient presents for initial visit. Ambulates with CAM boot, heel lifts and crutches. Reports RLE swelling, worse with boot Denies R leg pain Denies testosterone or anabolic steroid use. On Eliquis 10 mg BID started on 4/30/23   FMH: Denies family h/o blood clots Sx: hand sx, tonsillitis, cyst in throat removed, shoulder sx Socx: Never smoker, social EtOH use. 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  Care Team: Orthopaedist, Dr. Sahu Vascular surgeon Dr. Grimes.       8/2024: Returns for follow up. Patient had urgent ortho surgery on LLE following injury. Was discharged on prophylactic dose Xarelto for 2-3 weeks. After completing course, had increased pain and swelling. Had US showing below the knee acute DVT. Was prescribed Xarelto 30 mg daily for 1 month but was having difficulty getting prescription filled as well as getting a prescription for full 3-month course from Ortho specialist. Began taking left over Eliquis 5 mg BID he had at home. Was then seen in Hematology clinic, instructed to continue Eliquis for total of 3 months for provoked DVT.  Today, patient brought back to clinic for hypercoagulable work-up. Called clinic earlier for concern for LE swelling after prolonged flight. Bilateral Doppler was negative for DVT so AC not needed.

## 2024-08-28 LAB
APTT BLD: 37.6 SEC
AT III PPP CHRO-ACNC: 108 %
B2 GLYCOPROT1 IGA SERPL IA-ACNC: <2 U/ML
B2 GLYCOPROT1 IGG SER-ACNC: <1.4 U/ML
B2 GLYCOPROT1 IGM SER-ACNC: <1.5 U/ML
CARDIOLIPIN IGM SER-MCNC: 1.7 MPL U/ML
CARDIOLIPIN IGM SER-MCNC: <1.6 GPL U/ML
DEPRECATED CARDIOLIPIN IGA SER: <2 APL U/ML
HCYS SERPL-MCNC: 8.3 UMOL/L
PROT C PPP CHRO-ACNC: 103 %
PROT S AG ACT/NOR PPP IA: 111 %

## 2024-08-29 LAB
CONFIRM: 31.8 SEC
DRVVT IMM 1:2 NP PPP: NORMAL
DRVVT SCREEN TO CONFIRM RATIO: 0.86 RATIO
SCREEN DRVVT: 32.2 SEC
SILICA CLOTTING TIME INTERPRETATION: NORMAL
SILICA CLOTTING TIME S/C: 0.89 RATIO

## 2024-09-05 LAB
DNA PLOIDY SPEC FC-IMP: NORMAL
PTR INTERP: NORMAL

## 2024-09-16 DIAGNOSIS — D68.52 PROTHROMBIN GENE MUTATION: ICD-10-CM

## 2024-09-16 DIAGNOSIS — Z86.2 PERSONAL HISTORY OF DISEASES OF THE BLOOD AND BLOOD-FORMING ORGANS AND CERTAIN DISORDERS INVOLVING THE IMMUNE MECHANISM: ICD-10-CM
